# Patient Record
Sex: MALE | Race: WHITE | URBAN - METROPOLITAN AREA
[De-identification: names, ages, dates, MRNs, and addresses within clinical notes are randomized per-mention and may not be internally consistent; named-entity substitution may affect disease eponyms.]

---

## 2019-04-23 ENCOUNTER — NURSE TRIAGE (OUTPATIENT)
Dept: NURSING | Facility: CLINIC | Age: 69
End: 2019-04-23

## 2019-04-23 NOTE — TELEPHONE ENCOUNTER
Ezequiel has [employer] and has medicare part A and part B.  Today is calling inquiring about locations of clinics close to him.  [facility] advised [facility] and [facility].  Patient will phone back when card comes in mail.     1

## 2019-07-05 ENCOUNTER — OFFICE VISIT (OUTPATIENT)
Dept: FAMILY MEDICINE | Facility: CLINIC | Age: 69
End: 2019-07-05
Payer: COMMERCIAL

## 2019-07-05 VITALS
WEIGHT: 152 LBS | HEIGHT: 60 IN | SYSTOLIC BLOOD PRESSURE: 120 MMHG | HEART RATE: 78 BPM | DIASTOLIC BLOOD PRESSURE: 70 MMHG | RESPIRATION RATE: 16 BRPM | TEMPERATURE: 97.8 F | BODY MASS INDEX: 29.84 KG/M2 | OXYGEN SATURATION: 97 %

## 2019-07-05 DIAGNOSIS — E78.00 HYPERCHOLESTEROLEMIA: ICD-10-CM

## 2019-07-05 DIAGNOSIS — J30.1 SEASONAL ALLERGIC RHINITIS DUE TO POLLEN: ICD-10-CM

## 2019-07-05 DIAGNOSIS — B18.2 CHRONIC HEPATITIS C WITHOUT HEPATIC COMA (H): ICD-10-CM

## 2019-07-05 DIAGNOSIS — N52.9 ERECTILE DYSFUNCTION, UNSPECIFIED ERECTILE DYSFUNCTION TYPE: ICD-10-CM

## 2019-07-05 DIAGNOSIS — I10 BENIGN ESSENTIAL HYPERTENSION: Primary | ICD-10-CM

## 2019-07-05 PROCEDURE — 99203 OFFICE O/P NEW LOW 30 MIN: CPT | Performed by: FAMILY MEDICINE

## 2019-07-05 RX ORDER — HYDROCHLOROTHIAZIDE 12.5 MG/1
CAPSULE ORAL
Refills: 3 | COMMUNITY
Start: 2019-06-03

## 2019-07-05 RX ORDER — CETIRIZINE HYDROCHLORIDE 10 MG/1
10 TABLET ORAL DAILY
Qty: 90 TABLET | Refills: 1 | Status: SHIPPED | OUTPATIENT
Start: 2019-07-05 | End: 2020-07-21

## 2019-07-05 RX ORDER — ATORVASTATIN CALCIUM 10 MG/1
10 TABLET, FILM COATED ORAL DAILY
Refills: 1 | COMMUNITY
Start: 2019-06-03

## 2019-07-05 RX ORDER — SILDENAFIL 100 MG/1
100 TABLET, FILM COATED ORAL
Qty: 6 TABLET | Refills: 3 | Status: SHIPPED | OUTPATIENT
Start: 2019-07-05

## 2019-07-05 RX ORDER — OMEGA-3 FATTY ACIDS/FISH OIL 300-1000MG
CAPSULE ORAL DAILY
COMMUNITY

## 2019-07-05 RX ORDER — CETIRIZINE HYDROCHLORIDE 10 MG/1
10 TABLET ORAL DAILY
Refills: 1 | COMMUNITY
Start: 2019-06-03 | End: 2019-07-05

## 2019-07-05 ASSESSMENT — ACTIVITIES OF DAILY LIVING (ADL)
CURRENT_FUNCTION: HOUSEWORK REQUIRES ASSISTANCE
CURRENT_FUNCTION: NO ASSISTANCE NEEDED

## 2019-07-05 ASSESSMENT — MIFFLIN-ST. JEOR: SCORE: 1298.22

## 2019-07-05 NOTE — PROGRESS NOTES
Subjective     Ezequiel Rodriguez is a 69 year old male who presents to clinic today for the following health issues:    HPI     Patient is new to [place]. Here to establish care as insurance changed.  Previous clinic [place].    Due to language barrier, an  was present during the history-taking and subsequent discussion (and for part of the physical exam) with this patient.    Previous testings done in native country, [facility]. Patient unfortunately left reports there for prostate concerns. All results came back normal.    Additional requests: Rx for BP cuff and Viagra    Genitourinary symptoms      Duration: ongoing    Description:  retention    Intensity:  moderate    Accompanying signs and symptoms (fever/discharge/nausea/vomiting/back or abdominal pain):  None    History (frequent UTI's/kidney stones/prostate problems): None  Sexually active: no     Precipitating or alleviating factors: None    Therapies tried and outcome: none    Decreased erectile ability, not able to get full erection         GERD/Heartburn      Duration: ongoing    Description (location/character/radiation): LT chest pain occasionally    Intensity:  mild    Accompanying signs and symptoms:  food getting stuck: no   nausea/vomiting/blood: no   abdominal pain: YES  black/tarry or bloody stools: no :    History (similar episodes/previous evaluation): EKG done at previous clinic-normal    Precipitating or alleviating factors:  worse with fatty foods.  current NSAID/Aspirin use: no     Therapies tried and outcome: none      Hyperlipidemia Follow-Up      Are you having any of the following symptoms? (Select all that apply)  Chest pain or pressure    Are you regularly taking any medication or supplement to lower your cholesterol?   Yes- daily    Are you having muscle aches or other side effects that you think could be caused by your cholesterol lowering medication?  No      Hypertension Follow-up    Do you check your blood pressure  "regularly outside of the clinic? No     Are you following a low salt diet? No    Are your blood pressures ever more than 140 on the top number (systolic) OR more   than 90 on the bottom number (diastolic), for example 140/90? No     Hepatitis C       Duration: test recently done at [facility]    Description (location/character/radiation): positive confimatory test, no symptoms    Intensity:  No symptoms    Accompanying signs and symptoms: none    History (similar episodes/previous evaluation): None    Precipitating or alleviating factors: None    Therapies tried and outcome: None   Referral from [facility] was being made to [place] [facility].  Now insurance has changed.  He never got appt scheduled    BP Readings from Last 3 Encounters:   07/05/19 120/70    Wt Readings from Last 3 Encounters:   07/05/19 68.9 kg (152 lb)                      Reviewed and updated as needed this visit by Provider  Tobacco  Allergies  Meds  Problems  Med Hx  Surg Hx  Fam Hx         Review of Systems   ROS COMP: CONSTITUTIONAL: NEGATIVE for fever, chills, change in weight  ENT/MOUTH: NEGATIVE for ear, mouth and throat problems  RESP: NEGATIVE for significant cough or SOB  CV: NEGATIVE for chest pain, palpitations or peripheral edema  : positive for and erectile dysfunction      Objective    /70 (BP Location: Right arm, Patient Position: Sitting, Cuff Size: Adult Regular)   Pulse 78   Temp 97.8  F (36.6  C) (Tympanic)   Resp 16   Ht 1.518 m (4' 11.76\")   Wt 68.9 kg (152 lb)   SpO2 97%   BMI 29.92 kg/m    Body mass index is 29.92 kg/m .  Physical Exam   GENERAL: healthy, alert and no distress  NECK: no adenopathy, no asymmetry, masses, or scars and thyroid normal to palpation  RESP: lungs clear to auscultation - no rales, rhonchi or wheezes  CV: regular rate and rhythm, normal S1 S2, no S3 or S4, no murmur, click or rub, no peripheral edema and peripheral pulses strong  ABDOMEN: soft, nontender, no " "hepatosplenomegaly, no masses and bowel sounds normal  MS: no gross musculoskeletal defects noted, no edema    Diagnostic Test Results:  Labs reviewed in Epic  none         Assessment & Plan     Ezequiel was seen today for new patient and medicare visit.    Diagnoses and all orders for this visit:    Benign essential hypertension    Chronic hepatitis C without hepatic coma (H)  -     GASTROENTEROLOGY ADULT REF CONSULT ONLY    Hypercholesterolemia    Erectile dysfunction, unspecified erectile dysfunction type  -     sildenafil (VIAGRA) 100 MG tablet; Take 1 tablet (100 mg) by mouth every 3 days    Seasonal allergic rhinitis due to pollen  -     cetirizine (ZYRTEC) 10 MG tablet; Take 1 tablet (10 mg) by mouth daily         BMI:   Estimated body mass index is 29.92 kg/m  as calculated from the following:    Height as of this encounter: 1.518 m (4' 11.76\").    Weight as of this encounter: 68.9 kg (152 lb).   Weight management plan: Discussed healthy diet and exercise guidelines        CONSULTATION/REFERRAL to [facility] for Hepatitis C treatment  FUTURE APPOINTMENTS:       - Follow-up visit in 3 mo   Patient Instructions   Continue with current medications      Return in about 3 months (around 10/5/2019) for Hypertension, hypercholesterol.    Fritz Knight MD  [facility]        "

## 2019-11-13 ENCOUNTER — TELEPHONE (OUTPATIENT)
Dept: FAMILY MEDICINE | Facility: CLINIC | Age: 69
End: 2019-11-13

## 2019-11-13 NOTE — TELEPHONE ENCOUNTER
Panel Management Review      Patient has the following on his problem list:       IVD   ASA: MONITOR    Last LDL:    No results found for: CHOL  No results found for: HDL  No results found for: LDL  No results found for: TRIG   No results found for: CHOLHDLRATIO     Is the patient on a Statin? YES   Is the patient on Aspirin? NO                  Medications     HMG CoA Reductase Inhibitors     atorvastatin (LIPITOR) 10 MG tablet             Last three blood pressure readings:  BP Readings from Last 3 Encounters:   07/05/19 120/70        Tobacco History:     History   Smoking Status     Never Smoker   Smokeless Tobacco     Never Used       Hypertension   Last three blood pressure readings:  BP Readings from Last 3 Encounters:   07/05/19 120/70     Blood pressure: MONITOR    HTN Guidelines:  Less than 140/90      Composite cancer screening  Chart review shows that this patient is due/due soon for the following Colonoscopy  Summary:    Patient is due/failing the following:   BP CHECK, COLONOSCOPY, FOLLOW UP, LDL and PHYSICAL    Action needed:   Patient needs office visit for WELLNESS EXAM, COLONOSCOPY.    Type of outreach:    Sent letter.    Questions for provider review:    None                                                                                                                                    Princess JAMILAH Felder CMA       Chart routed to none.

## 2019-11-13 NOTE — LETTER
[facility]  [address]  [address]  [place] [id number] [phone]                                                                                                           Ezequiel Rodriguez  [address] [place] [address]    November 13, 2019      Dear Ezequiel,    I hope you are doing well.       This is a reminder that you are due for a Wellness Visit (annual full physical).   So that you get your desired appointment time, please call [phone] or [phone] to schedule this or you can use [employer] if you have an account. If you have had this visit completed elsewhere, or you believe you received this letter in error, please contact us at [phone] or [phone].    Lab work may be done at this visit.  Please prepare to fast-nothing to eat except water and/or meds for 8-10 hours).    In addition, here is a list of due or overdue Health Maintenance reminders.    Health Maintenance Due   Topic     ADVANCE CARE PLANNING      COLONOSCOPY      LIPID      ZOSTER IMMUNIZATION (1 of 2)     MEDICARE ANNUAL WELLNESS VISIT      AORTIC ANEURYSM SCREENING (SYSTEM ASSIGNED)      INFLUENZA VACCINE (1)     Best Regards,    Fritz Knight MD and Princess JAMILAH Felder [facility]

## 2020-07-21 DIAGNOSIS — J30.1 SEASONAL ALLERGIC RHINITIS DUE TO POLLEN: ICD-10-CM

## 2020-07-21 RX ORDER — CETIRIZINE HYDROCHLORIDE 10 MG/1
10 TABLET ORAL DAILY
Qty: 30 TABLET | Refills: 0 | Status: SHIPPED | OUTPATIENT
Start: 2020-07-21